# Patient Record
Sex: MALE | Race: BLACK OR AFRICAN AMERICAN
[De-identification: names, ages, dates, MRNs, and addresses within clinical notes are randomized per-mention and may not be internally consistent; named-entity substitution may affect disease eponyms.]

---

## 2021-01-12 ENCOUNTER — NURSE TRIAGE (OUTPATIENT)
Dept: OTHER | Facility: CLINIC | Age: 71
End: 2021-01-12

## 2021-01-12 NOTE — TELEPHONE ENCOUNTER
Call received on [address]. Brief description of triage: Son, calling in with concerns for his father. He starts the conversation by saying that Nicolas experiences SOB, however once out of ear shot he reveals that he is concerned for his father's mental status / depression. Son states that his father has lost a lot of weight and has not bathed himself for over 3 weeks. Currently on antidepressants reports that they are being taken as prescribed. Detailed information of triage listed below. Triage indicates for patient to ER    Care advice provided. Recommended using local department of health website for testing locations and up to date information. Caller verbalizes understanding, denies any other questions or concerns; instructed to call back for any new or worsening symptoms. Answer Assessment - Initial Assessment Questions  1. CONCERN: \"What happened that made you call today? \"      Son calling to state that he is not taking care of himself,    2. DEPRESSION SYMPTOM SCREENING: \"How are you feeling overall? \" (e.g., decreased energy, increased sleeping or difficulty sleeping, difficulty concentrating, feelings of sadness, guilt, hopelessness, or worthlessness)      All of those    3. RISK OF HARM - SUICIDAL IDEATION:  \"Do you ever have thoughts of hurting or killing yourself? \"  (e.g., yes, no, no but preoccupation with thoughts about death)    - INTENT:  \"Do you have thoughts of hurting or killing yourself right NOW? \" (e.g., yes, no, N/A)    - PLAN: \"Do you have a specific plan for how you would do this? \" (e.g., gun, knife, overdose, no plan, N/A)      No    4. RISK OF HARM - HOMICIDAL IDEATION:  \"Do you ever have thoughts of hurting or killing someone else? \"  (e.g., yes, no, no but preoccupation with thoughts about death)    - INTENT:  \"Do you have thoughts of hurting or killing someone right NOW? \" (e.g., yes, no, N/A)    - PLAN: \"Do you have a specific plan for how you would do this? \" (e.g., gun, knife, no plan, N/A)       No    5. FUNCTIONAL IMPAIRMENT: \"How have things been going for you overall in your life? Have you had any more difficulties than usual doing your normal daily activities? \"  (e.g., better, same, worse; self-care, school, work, interactions)      Yes, self care    6. SUPPORT: \"Who is with you now? \" \"Who do you live with?\" \"Do you have family or friends nearby who you can talk to?\"       Son, Eunice Newton is calling    7. THERAPIST: \"Do you have a counselor or therapist? Name? \"      No    8. STRESSORS: \"Has there been any new stress or recent changes in your life? \"      Divorce 2 years ago and getting worse over time    9. DRUG ABUSE/ALCOHOL: \"Do you drink alcohol or use any illegal drugs? \"       Unable to complete  10. OTHER: \"Do you have any other health or medical symptoms right now? \" (e.g., fever)        *No Answer*  11. PREGNANCY: \"Is there any chance you are pregnant? \" \"When was your last menstrual period? \"        *No Answer*    Protocols used: DEPRESSION-ADULT-[place]

## 2021-01-12 NOTE — TELEPHONE ENCOUNTER
Reason for Disposition   Depression and unable to do any of normal activities (e.g., self care, school, work; in [facility] 11 to baseline).     Protocols used: [id number]-[id number]